# Patient Record
Sex: FEMALE | Race: WHITE
[De-identification: names, ages, dates, MRNs, and addresses within clinical notes are randomized per-mention and may not be internally consistent; named-entity substitution may affect disease eponyms.]

---

## 2020-12-08 ENCOUNTER — HOSPITAL ENCOUNTER (EMERGENCY)
Dept: HOSPITAL 12 - ER | Age: 25
Discharge: HOME | End: 2020-12-08
Payer: COMMERCIAL

## 2020-12-08 VITALS — HEIGHT: 62 IN | WEIGHT: 170 LBS | BODY MASS INDEX: 31.28 KG/M2

## 2020-12-08 VITALS — SYSTOLIC BLOOD PRESSURE: 128 MMHG | DIASTOLIC BLOOD PRESSURE: 79 MMHG

## 2020-12-08 DIAGNOSIS — H60.91: Primary | ICD-10-CM

## 2020-12-08 PROCEDURE — A4663 DIALYSIS BLOOD PRESSURE CUFF: HCPCS

## 2020-12-08 NOTE — NUR
Patient discharged to home in stable condition.  Written and verbal after care 
instructions given. 

Patient verbalizes understanding of instructions. Stressed follow up or return 
to ER for worsening s/s. All belongings gvien to patient, patient was able to 
ambulate with stable gait. Patient instructed that she cannot drive with 
medications administered. Patient stated, "I had someone drop me off, and they 
will be picking me up to take me home.".

## 2022-07-08 ENCOUNTER — HOSPITAL ENCOUNTER (EMERGENCY)
Dept: HOSPITAL 54 - ER | Age: 27
LOS: 1 days | Discharge: HOME | End: 2022-07-09
Payer: COMMERCIAL

## 2022-07-08 VITALS — BODY MASS INDEX: 33.13 KG/M2 | HEIGHT: 62 IN | WEIGHT: 180 LBS

## 2022-07-08 DIAGNOSIS — M54.50: ICD-10-CM

## 2022-07-08 DIAGNOSIS — N39.0: Primary | ICD-10-CM

## 2022-07-08 DIAGNOSIS — Z88.6: ICD-10-CM

## 2022-07-08 DIAGNOSIS — Z60.2: ICD-10-CM

## 2022-07-08 LAB
ALBUMIN SERPL BCP-MCNC: 3.8 G/DL (ref 3.4–5)
ALP SERPL-CCNC: 84 U/L (ref 46–116)
ALT SERPL W P-5'-P-CCNC: 123 U/L (ref 12–78)
AST SERPL W P-5'-P-CCNC: 53 U/L (ref 15–37)
BASOPHILS # BLD AUTO: 0.1 K/UL (ref 0–0.2)
BASOPHILS NFR BLD AUTO: 0.6 % (ref 0–2)
BILIRUB DIRECT SERPL-MCNC: 0.1 MG/DL (ref 0–0.2)
BILIRUB SERPL-MCNC: 0.3 MG/DL (ref 0.2–1)
BILIRUB UR QL STRIP: NEGATIVE
BUN SERPL-MCNC: 11 MG/DL (ref 7–18)
CALCIUM SERPL-MCNC: 9.1 MG/DL (ref 8.5–10.1)
CHLORIDE SERPL-SCNC: 103 MMOL/L (ref 98–107)
CO2 SERPL-SCNC: 29 MMOL/L (ref 21–32)
COLOR UR: YELLOW
CREAT SERPL-MCNC: 0.8 MG/DL (ref 0.6–1.3)
EOSINOPHIL NFR BLD AUTO: 2.8 % (ref 0–6)
GLUCOSE SERPL-MCNC: 98 MG/DL (ref 74–106)
GLUCOSE UR STRIP-MCNC: NEGATIVE MG/DL
HCT VFR BLD AUTO: 40 % (ref 33–45)
HGB BLD-MCNC: 13.2 G/DL (ref 11.5–14.8)
LEUKOCYTE ESTERASE UR QL STRIP: (no result)
LIPASE SERPL-CCNC: 123 U/L (ref 73–393)
LYMPHOCYTES NFR BLD AUTO: 2.9 K/UL (ref 0.8–4.8)
LYMPHOCYTES NFR BLD AUTO: 30.7 % (ref 20–44)
MCHC RBC AUTO-ENTMCNC: 33 G/DL (ref 31–36)
MCV RBC AUTO: 87 FL (ref 82–100)
MONOCYTES NFR BLD AUTO: 0.9 K/UL (ref 0.1–1.3)
MONOCYTES NFR BLD AUTO: 9.9 % (ref 2–12)
NEUTROPHILS # BLD AUTO: 5.3 K/UL (ref 1.8–8.9)
NEUTROPHILS NFR BLD AUTO: 56 % (ref 43–81)
NITRITE UR QL STRIP: NEGATIVE
PH UR STRIP: 7 [PH] (ref 5–8)
PLATELET # BLD AUTO: 327 K/UL (ref 150–450)
POTASSIUM SERPL-SCNC: 3.8 MMOL/L (ref 3.5–5.1)
PROT SERPL-MCNC: 8.2 G/DL (ref 6.4–8.2)
PROT UR QL STRIP: NEGATIVE MG/DL
RBC # BLD AUTO: 4.6 MIL/UL (ref 4–5.2)
RBC #/AREA URNS HPF: (no result) /HPF (ref 0–2)
SODIUM SERPL-SCNC: 135 MMOL/L (ref 136–145)
UROBILINOGEN UR STRIP-MCNC: 1 EU/DL
WBC #/AREA URNS HPF: (no result) /HPF (ref 0–3)
WBC NRBC COR # BLD AUTO: 9.5 K/UL (ref 4.3–11)

## 2022-07-08 PROCEDURE — 96374 THER/PROPH/DIAG INJ IV PUSH: CPT

## 2022-07-08 PROCEDURE — 99284 EMERGENCY DEPT VISIT MOD MDM: CPT

## 2022-07-08 PROCEDURE — 85730 THROMBOPLASTIN TIME PARTIAL: CPT

## 2022-07-08 PROCEDURE — 83690 ASSAY OF LIPASE: CPT

## 2022-07-08 PROCEDURE — 80048 BASIC METABOLIC PNL TOTAL CA: CPT

## 2022-07-08 PROCEDURE — 85025 COMPLETE CBC W/AUTO DIFF WBC: CPT

## 2022-07-08 PROCEDURE — 87086 URINE CULTURE/COLONY COUNT: CPT

## 2022-07-08 PROCEDURE — 80076 HEPATIC FUNCTION PANEL: CPT

## 2022-07-08 PROCEDURE — 74176 CT ABD & PELVIS W/O CONTRAST: CPT

## 2022-07-08 PROCEDURE — 36415 COLL VENOUS BLD VENIPUNCTURE: CPT

## 2022-07-08 PROCEDURE — 96361 HYDRATE IV INFUSION ADD-ON: CPT

## 2022-07-08 PROCEDURE — 96375 TX/PRO/DX INJ NEW DRUG ADDON: CPT

## 2022-07-08 PROCEDURE — 81001 URINALYSIS AUTO W/SCOPE: CPT

## 2022-07-08 SDOH — SOCIAL STABILITY - SOCIAL INSECURITY: PROBLEMS RELATED TO LIVING ALONE: Z60.2

## 2022-07-09 VITALS — DIASTOLIC BLOOD PRESSURE: 76 MMHG | SYSTOLIC BLOOD PRESSURE: 139 MMHG

## 2022-07-09 NOTE — NUR
Patient discharged to home in stable condition. Written and verbal after care 
instructions given. Patient verbalizes understanding of instruction. She has been having increased chest symptoms with exercise and quitting shortness of breath, coughing and chest tightness. She does not have albuterol. She additionally can have symptoms with hot weather. No other triggers that she is aware of. She has never been diagnosed with asthma.    Spirometry today with an FEV1 of 67%. She was given albuterol and had repeat spirometry and had improvement of her FEV1 to 79%. She had significant improvement in both her FEV1 and FEF 25-75.  ACT 18    - An asthma action plan was provided and discussed with patient and family.   - Albuterol 2-4 puffs inhaled (use a spacer unless using a Proair Respiclick device) every 4 hours as needed for chest tightness, wheezing, shortness of breath and/or coughing.   - Albuterol 2-4 puffs inhaled (use spacer if not using Proair Respiclick device) 15-20 minutes prior to physical activity.   - Please ensure warm up period prior to exercise.   - Avoid asthma triggers.  - Start QVAR 80mcg 2 puffs inhaled twice daily. Use with spacer. Instructed how and when to use.   - Return to clinic for allergen skin testing.

## 2022-08-17 ENCOUNTER — HOSPITAL ENCOUNTER (EMERGENCY)
Dept: HOSPITAL 54 - ER | Age: 27
Discharge: HOME | End: 2022-08-17
Payer: COMMERCIAL

## 2022-08-17 VITALS — DIASTOLIC BLOOD PRESSURE: 90 MMHG | SYSTOLIC BLOOD PRESSURE: 125 MMHG

## 2022-08-17 VITALS — BODY MASS INDEX: 40.12 KG/M2 | HEIGHT: 62 IN | WEIGHT: 218 LBS

## 2022-08-17 DIAGNOSIS — U07.1: Primary | ICD-10-CM

## 2022-08-17 DIAGNOSIS — J06.9: ICD-10-CM

## 2022-08-17 DIAGNOSIS — R00.0: ICD-10-CM

## 2022-08-17 PROCEDURE — C9803 HOPD COVID-19 SPEC COLLECT: HCPCS

## 2022-08-17 PROCEDURE — 87070 CULTURE OTHR SPECIMN AEROBIC: CPT

## 2022-08-17 PROCEDURE — 99285 EMERGENCY DEPT VISIT HI MDM: CPT

## 2022-08-17 PROCEDURE — U0003 INFECTIOUS AGENT DETECTION BY NUCLEIC ACID (DNA OR RNA); SEVERE ACUTE RESPIRATORY SYNDROME CORONAVIRUS 2 (SARS-COV-2) (CORONAVIRUS DISEASE [COVID-19]), AMPLIFIED PROBE TECHNIQUE, MAKING USE OF HIGH THROUGHPUT TECHNOLOGIES AS DESCRIBED BY CMS-2020-01-R: HCPCS

## 2022-08-17 PROCEDURE — 87426 SARSCOV CORONAVIRUS AG IA: CPT

## 2022-08-17 PROCEDURE — 87880 STREP A ASSAY W/OPTIC: CPT

## 2022-08-17 PROCEDURE — 71045 X-RAY EXAM CHEST 1 VIEW: CPT

## 2022-08-17 PROCEDURE — 93005 ELECTROCARDIOGRAM TRACING: CPT

## 2022-08-17 NOTE — NUR
BIBS C/O FEVER, FLU LIKE SYMPTOMS SINCE YESTERDAY. PT HAS A FEVER .2. 
ATTACHED TO MONIOTR, HEART RATED ELEVATED, MD AWARE. AWAITING MD [place].